# Patient Record
Sex: MALE | Race: WHITE | NOT HISPANIC OR LATINO | URBAN - METROPOLITAN AREA
[De-identification: names, ages, dates, MRNs, and addresses within clinical notes are randomized per-mention and may not be internally consistent; named-entity substitution may affect disease eponyms.]

---

## 2017-11-16 ENCOUNTER — EMERGENCY (EMERGENCY)
Facility: HOSPITAL | Age: 32
LOS: 1 days | Discharge: HOME | End: 2017-11-16
Attending: EMERGENCY MEDICINE | Admitting: EMERGENCY MEDICINE
Payer: COMMERCIAL

## 2017-11-16 ENCOUNTER — TRANSCRIPTION ENCOUNTER (OUTPATIENT)
Age: 32
End: 2017-11-16

## 2017-11-16 VITALS
TEMPERATURE: 98 F | HEART RATE: 75 BPM | SYSTOLIC BLOOD PRESSURE: 120 MMHG | OXYGEN SATURATION: 99 % | RESPIRATION RATE: 15 BRPM | DIASTOLIC BLOOD PRESSURE: 75 MMHG

## 2017-11-16 VITALS
RESPIRATION RATE: 16 BRPM | WEIGHT: 184.97 LBS | SYSTOLIC BLOOD PRESSURE: 140 MMHG | TEMPERATURE: 98 F | DIASTOLIC BLOOD PRESSURE: 80 MMHG | OXYGEN SATURATION: 98 % | HEART RATE: 80 BPM | HEIGHT: 71 IN

## 2017-11-16 PROCEDURE — 99284 EMERGENCY DEPT VISIT MOD MDM: CPT | Mod: 25

## 2017-11-16 PROCEDURE — 96372 THER/PROPH/DIAG INJ SC/IM: CPT

## 2017-11-16 PROCEDURE — 99284 EMERGENCY DEPT VISIT MOD MDM: CPT

## 2017-11-16 PROCEDURE — 72125 CT NECK SPINE W/O DYE: CPT | Mod: 26

## 2017-11-16 PROCEDURE — 72125 CT NECK SPINE W/O DYE: CPT

## 2017-11-16 RX ORDER — DIAZEPAM 5 MG
5 TABLET ORAL ONCE
Qty: 0 | Refills: 0 | Status: DISCONTINUED | OUTPATIENT
Start: 2017-11-16 | End: 2017-11-16

## 2017-11-16 RX ORDER — KETOROLAC TROMETHAMINE 30 MG/ML
60 SYRINGE (ML) INJECTION ONCE
Qty: 0 | Refills: 0 | Status: DISCONTINUED | OUTPATIENT
Start: 2017-11-16 | End: 2017-11-16

## 2017-11-16 RX ADMIN — Medication 60 MILLIGRAM(S): at 17:00

## 2017-11-16 RX ADMIN — Medication 60 MILLIGRAM(S): at 17:23

## 2017-11-16 RX ADMIN — Medication 60 MILLIGRAM(S): at 16:29

## 2017-11-16 RX ADMIN — Medication 5 MILLIGRAM(S): at 16:29

## 2017-11-16 NOTE — ED PROVIDER NOTE - MEDICAL DECISION MAKING DETAILS
31 YO M WITH L NECK PAIN X 2 DAYS. PT ALREADY TAKING ADVIL AND STARTED ON FLEXERIL TODAY AT URGENT CARE, C/O WORSENING PAIN, NVI. WILL GET CT C-SPINE, VALIUM AND TORADOL, RE-ASSESS. FEELING BETTER, WILL D/C HOME ON ADDITIONAL PREDNISONE AND F/U PMD

## 2017-11-16 NOTE — ED PROVIDER NOTE - PROGRESS NOTE DETAILS
Pt examined by ED attending, Dr. Carlos who agreed with disposition and plan.  CT scan results given to pt, feeling better, pt already has advil and flexeril. Will add prednisone and f/u pmd.

## 2017-11-16 NOTE — ED PROVIDER NOTE - ATTENDING CONTRIBUTION TO CARE
I, Dr Carlos, performed the initial face to face bedside interview with this patient regarding history of present illness, review of symptoms and relevant past medical, social and family history.  I completed an independent physical examination and confirmed and agree PA/NP Physical exam and finding.  I was the initial provider who evaluated this patient. I have signed out the follow up of any pending tests (i.e. labs, radiological studies) to the ACP.  I have communicated the patient’s plan of care and disposition with the ACP.

## 2017-11-16 NOTE — ED PROVIDER NOTE - OBJECTIVE STATEMENT
33 y/o M presents with c/o neck pain x 2 days. Pt states that he woke up yesterday morning with the pain, applied ice, has been taking advil 800mg and got an otc soft collar. Pt states that pain was worse today, went to [facility] urgent care and was given flexeril 10mg three times daily. Pt states that he is still in pain and decided to come to ED. States that pain is constant, worse with movement. Denies trauma/fall, numbness, tingling, focal weakness, headache, dizziness, cp, sob, n/v or other symptoms.

## 2017-11-16 NOTE — ED PROVIDER NOTE - MUSCULOSKELETAL NECK EXAM
PAIN ON MOVEMENT/+ttp L paravertebral spine with FROM but with pain on movement, no midline tenderness, no swelling/erythema/ecchymosis noted, +5/5 strength B UE&LE, pulses and sensation intact, fingers warm&mobile, NVI, no nuchal rigidity

## 2017-11-16 NOTE — ED PROVIDER NOTE - CHPI ED SYMPTOMS NEG
no back pain/no abrasion/no fever/no bruising/no tingling/no deformity/no weakness/no difficulty bearing weight/no numbness/no stiffness

## 2018-01-30 ENCOUNTER — EMERGENCY (EMERGENCY)
Facility: HOSPITAL | Age: 33
LOS: 1 days | Discharge: HOME | End: 2018-01-30
Attending: EMERGENCY MEDICINE | Admitting: EMERGENCY MEDICINE
Payer: COMMERCIAL

## 2018-01-30 VITALS
RESPIRATION RATE: 20 BRPM | HEIGHT: 71 IN | HEART RATE: 188 BPM | WEIGHT: 184.97 LBS | OXYGEN SATURATION: 100 % | SYSTOLIC BLOOD PRESSURE: 115 MMHG | DIASTOLIC BLOOD PRESSURE: 58 MMHG | TEMPERATURE: 98 F

## 2018-01-30 PROCEDURE — 93010 ELECTROCARDIOGRAM REPORT: CPT

## 2018-01-30 PROCEDURE — 99285 EMERGENCY DEPT VISIT HI MDM: CPT

## 2018-01-31 ENCOUNTER — EMERGENCY (EMERGENCY)
Facility: HOSPITAL | Age: 33
LOS: 1 days | Discharge: HOME | End: 2018-01-31
Attending: EMERGENCY MEDICINE | Admitting: EMERGENCY MEDICINE
Payer: COMMERCIAL

## 2018-01-31 VITALS
TEMPERATURE: 97 F | HEART RATE: 166 BPM | WEIGHT: 184.97 LBS | SYSTOLIC BLOOD PRESSURE: 108 MMHG | DIASTOLIC BLOOD PRESSURE: 63 MMHG | OXYGEN SATURATION: 98 % | HEIGHT: 71 IN | RESPIRATION RATE: 17 BRPM

## 2018-01-31 VITALS
DIASTOLIC BLOOD PRESSURE: 61 MMHG | RESPIRATION RATE: 14 BRPM | HEART RATE: 127 BPM | SYSTOLIC BLOOD PRESSURE: 105 MMHG | OXYGEN SATURATION: 98 %

## 2018-01-31 VITALS
RESPIRATION RATE: 16 BRPM | TEMPERATURE: 98 F | HEART RATE: 87 BPM | SYSTOLIC BLOOD PRESSURE: 97 MMHG | OXYGEN SATURATION: 98 % | DIASTOLIC BLOOD PRESSURE: 61 MMHG

## 2018-01-31 DIAGNOSIS — Z98.890 OTHER SPECIFIED POSTPROCEDURAL STATES: Chronic | ICD-10-CM

## 2018-01-31 LAB
ALBUMIN SERPL ELPH-MCNC: 3.8 G/DL — SIGNIFICANT CHANGE UP (ref 3.3–5)
ALBUMIN SERPL ELPH-MCNC: 4.4 G/DL — SIGNIFICANT CHANGE UP (ref 3.3–5)
ALP SERPL-CCNC: 115 U/L — SIGNIFICANT CHANGE UP (ref 30–120)
ALP SERPL-CCNC: 145 U/L — HIGH (ref 30–120)
ALT FLD-CCNC: 21 U/L DA — SIGNIFICANT CHANGE UP (ref 10–60)
ALT FLD-CCNC: 24 U/L DA — SIGNIFICANT CHANGE UP (ref 10–60)
ANION GAP SERPL CALC-SCNC: 10 MMOL/L — SIGNIFICANT CHANGE UP (ref 5–17)
ANION GAP SERPL CALC-SCNC: 9 MMOL/L — SIGNIFICANT CHANGE UP (ref 5–17)
APTT BLD: 34 SEC — SIGNIFICANT CHANGE UP (ref 27.5–37.4)
AST SERPL-CCNC: 19 U/L — SIGNIFICANT CHANGE UP (ref 10–40)
AST SERPL-CCNC: 21 U/L — SIGNIFICANT CHANGE UP (ref 10–40)
BASOPHILS # BLD AUTO: 0 K/UL — SIGNIFICANT CHANGE UP (ref 0–0.2)
BASOPHILS # BLD AUTO: 0.1 K/UL — SIGNIFICANT CHANGE UP (ref 0–0.2)
BASOPHILS NFR BLD AUTO: 0.4 % — SIGNIFICANT CHANGE UP (ref 0–2)
BASOPHILS NFR BLD AUTO: 0.7 % — SIGNIFICANT CHANGE UP (ref 0–2)
BILIRUB SERPL-MCNC: 0.4 MG/DL — SIGNIFICANT CHANGE UP (ref 0.2–1.2)
BILIRUB SERPL-MCNC: 0.5 MG/DL — SIGNIFICANT CHANGE UP (ref 0.2–1.2)
BUN SERPL-MCNC: 17 MG/DL — SIGNIFICANT CHANGE UP (ref 7–23)
BUN SERPL-MCNC: 23 MG/DL — SIGNIFICANT CHANGE UP (ref 7–23)
CALCIUM SERPL-MCNC: 8.5 MG/DL — SIGNIFICANT CHANGE UP (ref 8.4–10.5)
CALCIUM SERPL-MCNC: 9.1 MG/DL — SIGNIFICANT CHANGE UP (ref 8.4–10.5)
CHLORIDE SERPL-SCNC: 104 MMOL/L — SIGNIFICANT CHANGE UP (ref 96–108)
CHLORIDE SERPL-SCNC: 107 MMOL/L — SIGNIFICANT CHANGE UP (ref 96–108)
CK MB CFR SERPL CALC: 2.6 NG/ML — SIGNIFICANT CHANGE UP (ref 0–3.6)
CO2 SERPL-SCNC: 26 MMOL/L — SIGNIFICANT CHANGE UP (ref 22–31)
CO2 SERPL-SCNC: 30 MMOL/L — SIGNIFICANT CHANGE UP (ref 22–31)
CREAT SERPL-MCNC: 1.03 MG/DL — SIGNIFICANT CHANGE UP (ref 0.5–1.3)
CREAT SERPL-MCNC: 1.25 MG/DL — SIGNIFICANT CHANGE UP (ref 0.5–1.3)
EOSINOPHIL # BLD AUTO: 0.1 K/UL — SIGNIFICANT CHANGE UP (ref 0–0.5)
EOSINOPHIL # BLD AUTO: 0.1 K/UL — SIGNIFICANT CHANGE UP (ref 0–0.5)
EOSINOPHIL NFR BLD AUTO: 0.5 % — SIGNIFICANT CHANGE UP (ref 0–6)
EOSINOPHIL NFR BLD AUTO: 0.8 % — SIGNIFICANT CHANGE UP (ref 0–6)
GLUCOSE SERPL-MCNC: 100 MG/DL — HIGH (ref 70–99)
GLUCOSE SERPL-MCNC: 99 MG/DL — SIGNIFICANT CHANGE UP (ref 70–99)
HCT VFR BLD CALC: 46 % — SIGNIFICANT CHANGE UP (ref 39–50)
HCT VFR BLD CALC: 47.8 % — SIGNIFICANT CHANGE UP (ref 39–50)
HGB BLD-MCNC: 15.4 G/DL — SIGNIFICANT CHANGE UP (ref 13–17)
HGB BLD-MCNC: 16.7 G/DL — SIGNIFICANT CHANGE UP (ref 13–17)
INR BLD: 1.11 RATIO — SIGNIFICANT CHANGE UP (ref 0.88–1.16)
LYMPHOCYTES # BLD AUTO: 1.3 K/UL — SIGNIFICANT CHANGE UP (ref 1–3.3)
LYMPHOCYTES # BLD AUTO: 1.7 K/UL — SIGNIFICANT CHANGE UP (ref 1–3.3)
LYMPHOCYTES # BLD AUTO: 13 % — SIGNIFICANT CHANGE UP (ref 13–44)
LYMPHOCYTES # BLD AUTO: 22 % — SIGNIFICANT CHANGE UP (ref 13–44)
MAGNESIUM SERPL-MCNC: 2.1 MG/DL — SIGNIFICANT CHANGE UP (ref 1.6–2.6)
MCHC RBC-ENTMCNC: 29.8 PG — SIGNIFICANT CHANGE UP (ref 27–34)
MCHC RBC-ENTMCNC: 30.9 PG — SIGNIFICANT CHANGE UP (ref 27–34)
MCHC RBC-ENTMCNC: 33.5 GM/DL — SIGNIFICANT CHANGE UP (ref 32–36)
MCHC RBC-ENTMCNC: 35 GM/DL — SIGNIFICANT CHANGE UP (ref 32–36)
MCV RBC AUTO: 88.2 FL — SIGNIFICANT CHANGE UP (ref 80–100)
MCV RBC AUTO: 88.9 FL — SIGNIFICANT CHANGE UP (ref 80–100)
MONOCYTES # BLD AUTO: 0.6 K/UL — SIGNIFICANT CHANGE UP (ref 0–0.9)
MONOCYTES # BLD AUTO: 0.7 K/UL — SIGNIFICANT CHANGE UP (ref 0–0.9)
MONOCYTES NFR BLD AUTO: 5.9 % — SIGNIFICANT CHANGE UP (ref 2–14)
MONOCYTES NFR BLD AUTO: 9.5 % — SIGNIFICANT CHANGE UP (ref 2–14)
NEUTROPHILS # BLD AUTO: 5.1 K/UL — SIGNIFICANT CHANGE UP (ref 1.8–7.4)
NEUTROPHILS # BLD AUTO: 8.1 K/UL — HIGH (ref 1.8–7.4)
NEUTROPHILS NFR BLD AUTO: 67 % — SIGNIFICANT CHANGE UP (ref 43–77)
NEUTROPHILS NFR BLD AUTO: 80.2 % — HIGH (ref 43–77)
PLATELET # BLD AUTO: 223 K/UL — SIGNIFICANT CHANGE UP (ref 150–400)
PLATELET # BLD AUTO: 233 K/UL — SIGNIFICANT CHANGE UP (ref 150–400)
POTASSIUM SERPL-MCNC: 3.8 MMOL/L — SIGNIFICANT CHANGE UP (ref 3.5–5.3)
POTASSIUM SERPL-MCNC: 3.8 MMOL/L — SIGNIFICANT CHANGE UP (ref 3.5–5.3)
POTASSIUM SERPL-SCNC: 3.8 MMOL/L — SIGNIFICANT CHANGE UP (ref 3.5–5.3)
POTASSIUM SERPL-SCNC: 3.8 MMOL/L — SIGNIFICANT CHANGE UP (ref 3.5–5.3)
PROT SERPL-MCNC: 6.7 G/DL — SIGNIFICANT CHANGE UP (ref 6–8.3)
PROT SERPL-MCNC: 7.6 G/DL — SIGNIFICANT CHANGE UP (ref 6–8.3)
PROTHROM AB SERPL-ACNC: 12.1 SEC — SIGNIFICANT CHANGE UP (ref 9.8–12.7)
RBC # BLD: 5.17 M/UL — SIGNIFICANT CHANGE UP (ref 4.2–5.8)
RBC # BLD: 5.42 M/UL — SIGNIFICANT CHANGE UP (ref 4.2–5.8)
RBC # FLD: 11.5 % — SIGNIFICANT CHANGE UP (ref 10.3–14.5)
RBC # FLD: 11.8 % — SIGNIFICANT CHANGE UP (ref 10.3–14.5)
SODIUM SERPL-SCNC: 142 MMOL/L — SIGNIFICANT CHANGE UP (ref 135–145)
SODIUM SERPL-SCNC: 144 MMOL/L — SIGNIFICANT CHANGE UP (ref 135–145)
TROPONIN I SERPL-MCNC: 0.24 NG/ML — HIGH (ref 0.02–0.06)
TROPONIN I SERPL-MCNC: 0.31 NG/ML — HIGH (ref 0.02–0.06)
WBC # BLD: 10.1 K/UL — SIGNIFICANT CHANGE UP (ref 3.8–10.5)
WBC # BLD: 7.6 K/UL — SIGNIFICANT CHANGE UP (ref 3.8–10.5)
WBC # FLD AUTO: 10.1 K/UL — SIGNIFICANT CHANGE UP (ref 3.8–10.5)
WBC # FLD AUTO: 7.6 K/UL — SIGNIFICANT CHANGE UP (ref 3.8–10.5)

## 2018-01-31 PROCEDURE — 80053 COMPREHEN METABOLIC PANEL: CPT

## 2018-01-31 PROCEDURE — 83735 ASSAY OF MAGNESIUM: CPT

## 2018-01-31 PROCEDURE — 82553 CREATINE MB FRACTION: CPT

## 2018-01-31 PROCEDURE — 99284 EMERGENCY DEPT VISIT MOD MDM: CPT | Mod: 25

## 2018-01-31 PROCEDURE — 84484 ASSAY OF TROPONIN QUANT: CPT

## 2018-01-31 PROCEDURE — 96375 TX/PRO/DX INJ NEW DRUG ADDON: CPT

## 2018-01-31 PROCEDURE — 96376 TX/PRO/DX INJ SAME DRUG ADON: CPT

## 2018-01-31 PROCEDURE — 85610 PROTHROMBIN TIME: CPT

## 2018-01-31 PROCEDURE — 36415 COLL VENOUS BLD VENIPUNCTURE: CPT

## 2018-01-31 PROCEDURE — 71045 X-RAY EXAM CHEST 1 VIEW: CPT

## 2018-01-31 PROCEDURE — 85027 COMPLETE CBC AUTOMATED: CPT

## 2018-01-31 PROCEDURE — 96374 THER/PROPH/DIAG INJ IV PUSH: CPT

## 2018-01-31 PROCEDURE — 93010 ELECTROCARDIOGRAM REPORT: CPT

## 2018-01-31 PROCEDURE — 85730 THROMBOPLASTIN TIME PARTIAL: CPT

## 2018-01-31 PROCEDURE — 71045 X-RAY EXAM CHEST 1 VIEW: CPT | Mod: 26

## 2018-01-31 PROCEDURE — 93010 ELECTROCARDIOGRAM REPORT: CPT | Mod: 76

## 2018-01-31 PROCEDURE — 93005 ELECTROCARDIOGRAM TRACING: CPT

## 2018-01-31 PROCEDURE — 99285 EMERGENCY DEPT VISIT HI MDM: CPT

## 2018-01-31 RX ORDER — ADENOSINE 3 MG/ML
12 INJECTION INTRAVENOUS ONCE
Qty: 0 | Refills: 0 | Status: COMPLETED | OUTPATIENT
Start: 2018-01-31 | End: 2018-01-31

## 2018-01-31 RX ORDER — VERAPAMIL HCL 240 MG
5 CAPSULE, EXTENDED RELEASE PELLETS 24 HR ORAL ONCE
Qty: 0 | Refills: 0 | Status: COMPLETED | OUTPATIENT
Start: 2018-01-31 | End: 2018-01-31

## 2018-01-31 RX ORDER — ADENOSINE 3 MG/ML
6 INJECTION INTRAVENOUS ONCE
Qty: 0 | Refills: 0 | Status: COMPLETED | OUTPATIENT
Start: 2018-01-31 | End: 2018-01-31

## 2018-01-31 RX ORDER — METOPROLOL TARTRATE 50 MG
25 TABLET ORAL ONCE
Qty: 0 | Refills: 0 | Status: COMPLETED | OUTPATIENT
Start: 2018-01-31 | End: 2018-01-31

## 2018-01-31 RX ORDER — METOPROLOL TARTRATE 50 MG
1 TABLET ORAL
Qty: 30 | Refills: 0
Start: 2018-01-31 | End: 2018-03-01

## 2018-01-31 RX ADMIN — ADENOSINE 6 MILLIGRAM(S): 3 INJECTION INTRAVENOUS at 00:06

## 2018-01-31 RX ADMIN — Medication 5 MILLIGRAM(S): at 00:25

## 2018-01-31 RX ADMIN — Medication 25 MILLIGRAM(S): at 10:29

## 2018-01-31 RX ADMIN — ADENOSINE 6 MILLIGRAM(S): 3 INJECTION INTRAVENOUS at 00:15

## 2018-01-31 RX ADMIN — ADENOSINE 6 MILLIGRAM(S): 3 INJECTION INTRAVENOUS at 08:35

## 2018-01-31 RX ADMIN — ADENOSINE 6 MILLIGRAM(S): 3 INJECTION INTRAVENOUS at 00:01

## 2018-01-31 RX ADMIN — Medication 5 MILLIGRAM(S): at 08:40

## 2018-01-31 NOTE — ED PROVIDER NOTE - OBJECTIVE STATEMENT
Patient c/o his heart racing for about an hour. Has a history of a. fib in the past. Has had 2 ablations, last one 3 years ago, and has had no symptoms since.    Now feels minor discomfort in chest, no lightheadedness, no SOB. Ne fever or recent illness

## 2018-01-31 NOTE — ED ADULT NURSE NOTE - OBJECTIVE STATEMENT
32 yr old male c/o chest palpitations and slight chest discomfort about 1 hr ago. Pt has hx of afib. Pt states playing soccer; felt fine; went home to eat and started feeling chest palpitations.

## 2018-01-31 NOTE — ED PROVIDER NOTE - PROGRESS NOTE DETAILS
Patient did not break after 3 doses of adenosine. Given IV verapamil with termination of SVT. Now in NSR with short CO and no delta wave (?[id number]-[id number]-[id number]?)

## 2018-01-31 NOTE — ED PROVIDER NOTE - OBJECTIVE STATEMENT
33 yo male with hx of svt sp ablation at [facility] 2 yrs ago Dr. Maddox seen in ER last night for PSVT converted after adenosine and then Verapamil. CO recurrent SVT again this am. Palps and SOB

## 2018-01-31 NOTE — CONSULT NOTE ADULT - ASSESSMENT
The patient is a 32 year old male with a history of AF s/p ablation (PVI 3 years ago) who presents with palpitations and shortness of breath in the setting of SVT.    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 However, baseline ECG after return to sinus rhythm with inverted/biphasic P waves with short NV. This may be due to changes from prior ablations vs. less likely pre-excitation.  - Await call from patient's EP Dr. Maddox  - Will likely prescribe long acting oral medication (i.e. metoprolol succinate 25 mg daily) to prevent recurrence of SVT  - Patient will have close follow-up with EP regarding new SVT and possible need for ablation

## 2018-01-31 NOTE — ED PROVIDER NOTE - MEDICAL DECISION MAKING DETAILS
31 yo recurrent SVT treated with IV Verapamil twice. Now NSR. Plan Cardio consult. May need long term meds and repeat EPS studies.

## 2018-01-31 NOTE — ED PROVIDER NOTE - NS ED MD EKG INTERPRETATION 1
normal sinus rhythm, Normal axis, Normal NV interval and QRS complex. There are no acute ischemic ST or T-wave changes./after meds

## 2018-01-31 NOTE — ED PROVIDER NOTE - CHPI ED SYMPTOMS NEG
no syncope/no shortness of breath/no vomiting/no diaphoresis/no nausea/no cough/no back pain/no dizziness/no chills/no fever

## 2018-01-31 NOTE — CONSULT NOTE ADULT - SUBJECTIVE AND OBJECTIVE BOX
History of Present Illness: The patient is a 32 year old male with a history of AF s/p ablation (PVI 3 years ago) who presents with palpitations and shortness of breath. He was seen in ED overnight for similar symptoms lasting for hours, starting after playing soccer yesterday. He was found to be in SVT, given adenosine x3 with no effect, then verapamil with subsequent return to sinus rhythm. He returned this morning with recurrence of SVT. Again one dose of adenosine with no effect, then verapamil with subsequent termination.    Past Medical/Surgical History:  AF s/p ablation    Medications:  None    Family History: Non-contributory family history of premature cardiovascular atherosclerotic disease    Social History: No tobacco, alcohol or drug use    Review of Systems:  General: No fevers, chills, weight loss or gain  Skin: No rashes, color changes  Cardiovascular: No chest pain, orthopnea  Respiratory: (+) shortness of breath, no cough  Gastrointestinal: No nausea, abdominal pain  Genitourinary: No incontinence, pain with urination  Musculoskeletal: No pain, swelling, decreased range of motion  Neurological: No headache, weakness  Psychiatric: No depression, anxiety  Endocrine: No weight loss or gain, increased thirst  All other systems are comprehensively negative.    Physical Exam:  Vitals:        Vital Signs Last 24 Hrs  T(C): 36.2 (31 Jan 2018 08:07), Max: 36.8 (30 Jan 2018 23:45)  T(F): 97.2 (31 Jan 2018 08:07), Max: 98.2 (30 Jan 2018 23:45)  HR: 166 (31 Jan 2018 08:07) (86 - 188)  BP: 108/63 (31 Jan 2018 08:07) (97/61 - 115/58)  BP(mean): --  RR: 17 (31 Jan 2018 08:07) (16 - 20)  SpO2: 98% (31 Jan 2018 08:07) (98% - 100%)  General: NAD  HEENT: MMM  Neck: No JVD, no carotid bruit  Lungs: CTAB  CV: RRR, nl S1/S2, no M/R/G  Abdomen: S/NT/ND, +BS  Extremities: No LE edema, no cyanosis  Neuro: AAOx3, non-focal  Skin: No rash    Labs:                        15.4   10.1  )-----------( 223      ( 31 Jan 2018 08:58 )             46.0     01-31    144  |  104  |  23  ----------------------------<  100<H>  3.8   |  30  |  1.25    Ca    9.1      31 Jan 2018 00:38  Mg     2.1     01-31    TPro  7.6  /  Alb  4.4  /  TBili  0.4  /  DBili  x   /  AST  21  /  ALT  24  /  AlkPhos  145<H>  01-31            ECG:   #1: SVT, likely AVNRT  #2: Sinus rhythm, inverted/biphasic P waves in inferior leads, nonspecific abnormalities

## 2018-01-31 NOTE — ED ADULT NURSE NOTE - CHPI ED SYMPTOMS NEG
no syncope/no shortness of breath/no diaphoresis/no nausea/no dizziness/no back pain/no cough/no vomiting/no chills/no fever

## 2018-01-31 NOTE — ED PROVIDER NOTE - PROGRESS NOTE DETAILS
SVT resolved after verapamil 5mg. Seen by Dr. Barros. Elevated trops likely due to increased rate and myocardial demand. No concern for CAD due to lack of risk factors. Cleared for discharge with NILESH Maddox at [facility] today. Pts wife made appt via telephone with cardiologist. Will take Metoprolol XL 25mg today and rx for the same.

## 2018-05-15 ENCOUNTER — TRANSCRIPTION ENCOUNTER (OUTPATIENT)
Age: 33
End: 2018-05-15

## 2018-06-24 NOTE — ED PROVIDER NOTE - CARDIAC HEART SOUNDS
S1-S2 I will SWITCH the dose or number of times a day I take the medications listed below when I get home from the hospital:  None

## 2018-10-17 NOTE — ED PROVIDER NOTE - CROS ED CARDIOVAS ALL NEG
Pt called for a refill on his Neurontin sent to the [facility] next appt 1/15/2019    Health Maintenance   Topic Date Due    HIV screen  09/15/1987    Pneumococcal med risk (1 of 1 - PPSV23) 09/15/1991    Flu vaccine (1) 09/01/2018    Potassium monitoring  01/18/2019    Creatinine monitoring  01/18/2019    Lipid screen  02/24/2022    DTaP/Tdap/Td vaccine (2 - Td) 02/20/2028             (applicable per patient's age: Cancer Screenings, Depression Screening, Fall Risk Screening, Immunizations)    Hemoglobin A1C (%)   Date Value   01/18/2018 5.1   01/19/2016 5.2     LDL Cholesterol (mg/dL)   Date Value   02/24/2017 102     AST (U/L)   Date Value   01/18/2018 54 (H)     ALT (U/L)   Date Value   01/18/2018 47 (H)     BUN (mg/dL)   Date Value   01/18/2018 11      (goal A1C is < 7)   (goal LDL is <100) need 30-50% reduction from baseline     BP Readings from Last 3 Encounters:   10/10/18 (!) 126/90   10/10/18 (!) 140/98   08/07/18 139/88    (goal /80)      All Future Testing planned in [facility]:  Lab Frequency Next Occurrence   Lipid, Fasting Once 10/10/2018   HIV Screen Once 10/10/2018       Next Visit Date:  Future Appointments  Date Time Provider Fide Dang   11/28/2018 1:15 PM Lawson Syed DPM [facility]   1/15/2019 10:00 AM Magen Ibanez MD [facility]            Patient Active Problem List:     Uncontrolled hypertension     OA (osteoarthritis) of hip     Tendinitis of right shoulder     Callus of foot     Gouty arthritis     Uncontrolled hypertension     Mild intermittent asthma without complication
- - -

## 2019-03-17 ENCOUNTER — TRANSCRIPTION ENCOUNTER (OUTPATIENT)
Age: 34
End: 2019-03-17

## 2019-08-15 NOTE — ED ADULT TRIAGE NOTE - SPO2 (%)
Three attempts has been called to reach patient to go over medication list. Sent patient Add[employer] message.  Rohit Yates CMA on 8/15/2019 at 7:22 AM     100

## 2020-08-30 ENCOUNTER — TRANSCRIPTION ENCOUNTER (OUTPATIENT)
Age: 35
End: 2020-08-30

## 2021-03-29 ENCOUNTER — TRANSCRIPTION ENCOUNTER (OUTPATIENT)
Age: 36
End: 2021-03-29

## 2021-04-26 ENCOUNTER — TRANSCRIPTION ENCOUNTER (OUTPATIENT)
Age: 36
End: 2021-04-26

## 2021-05-30 ENCOUNTER — TRANSCRIPTION ENCOUNTER (OUTPATIENT)
Age: 36
End: 2021-05-30

## 2021-09-18 ENCOUNTER — TRANSCRIPTION ENCOUNTER (OUTPATIENT)
Age: 36
End: 2021-09-18

## 2021-11-16 ENCOUNTER — TRANSCRIPTION ENCOUNTER (OUTPATIENT)
Age: 36
End: 2021-11-16

## 2022-11-11 ENCOUNTER — NON-APPOINTMENT (OUTPATIENT)
Age: 37
End: 2022-11-11

## 2022-12-27 ENCOUNTER — NON-APPOINTMENT (OUTPATIENT)
Age: 37
End: 2022-12-27

## 2023-01-06 NOTE — ED PROVIDER NOTE - HEAD, MLM
Head is atraumatic. Head shape is symmetrical. Dupixent Pregnancy And Lactation Text: This medication likely crosses the placenta but the risk for the fetus is uncertain. This medication is excreted in breast milk.

## 2023-01-23 PROBLEM — I48.91 UNSPECIFIED ATRIAL FIBRILLATION: Chronic | Status: ACTIVE | Noted: 2018-01-31

## 2023-02-03 PROBLEM — Z00.00 ENCOUNTER FOR PREVENTIVE HEALTH EXAMINATION: Status: ACTIVE | Noted: 2023-02-03

## 2023-02-08 PROBLEM — K64.8 INTERNAL HEMORRHOIDS: Status: RESOLVED | Noted: 2023-02-08 | Resolved: 2023-02-08

## 2023-02-10 ENCOUNTER — APPOINTMENT (OUTPATIENT)
Dept: SURGERY | Facility: CLINIC | Age: 38
End: 2023-02-10
Payer: COMMERCIAL

## 2023-02-10 VITALS
DIASTOLIC BLOOD PRESSURE: 79 MMHG | TEMPERATURE: 97.2 F | OXYGEN SATURATION: 96 % | RESPIRATION RATE: 17 BRPM | WEIGHT: 195 LBS | HEART RATE: 87 BPM | HEIGHT: 70 IN | SYSTOLIC BLOOD PRESSURE: 117 MMHG | BODY MASS INDEX: 27.92 KG/M2

## 2023-02-10 DIAGNOSIS — Z80.0 FAMILY HISTORY OF MALIGNANT NEOPLASM OF DIGESTIVE ORGANS: ICD-10-CM

## 2023-02-10 DIAGNOSIS — Z78.9 OTHER SPECIFIED HEALTH STATUS: ICD-10-CM

## 2023-02-10 DIAGNOSIS — K64.8 OTHER HEMORRHOIDS: ICD-10-CM

## 2023-02-10 DIAGNOSIS — Z83.6 FAMILY HISTORY OF OTHER DISEASES OF THE RESPIRATORY SYSTEM: ICD-10-CM

## 2023-02-10 PROCEDURE — 99203 OFFICE O/P NEW LOW 30 MIN: CPT

## 2023-02-10 RX ORDER — DEXTROAMPHETAMINE SACCHARATE, AMPHETAMINE ASPARTATE, DEXTROAMPHETAMINE SULFATE, AND AMPHETAMINE SULFATE 2.5; 2.5; 2.5; 2.5 MG/1; MG/1; MG/1; MG/1
10 TABLET ORAL
Refills: 0 | Status: ACTIVE | COMMUNITY

## 2023-02-10 NOTE — ASSESSMENT
[FreeTextEntry1] : In summary the patient has a symptomatic posterior midline anal tag.  There is possible associated anal fistula and may be an underlying fissure.  I therefore recommended excision of his tag under sedation in the operating room.  If a fistula is seen I will likely perform an anal fistulotomy.  If there is an underlying fissure I will likely perform Botox injection.  Risks benefits and alternatives including the rare complications of bleeding infection and recurrence were explained.

## 2023-02-10 NOTE — CONSULT LETTER
[Dear  ___] : Dear  [unfilled], [Consult Letter:] : I had the pleasure of evaluating your patient, [unfilled]. [Please see my note below.] : Please see my note below. [Consult Closing:] : Thank you very much for allowing me to participate in the care of this patient.  If you have any questions, please do not hesitate to contact me. [Sincerely,] : Sincerely, [FreeTextEntry3] : Levon Borges M.D., F.A.C.S, F.A.S.C.R.S [DrPaolo  ___] : Dr. ALMENDAREZ

## 2023-02-10 NOTE — PHYSICAL EXAM
[Normal Breath Sounds] : Normal breath sounds [Normal Heart Sounds] : normal heart sounds [No Rash or Lesion] : No rash or lesion [Alert] : alert [Oriented to Person] : oriented to person [Oriented to Place] : oriented to place [Oriented to Time] : oriented to time [Calm] : calm [Abdomen Tenderness] : ~T No ~M abdominal tenderness [de-identified] : Perianal inspection reveals a posterior midline anal tag.  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  There is mild tenderness in the posterior midline with no visible fissure. [de-identified] : WNL [de-identified] : WNL [de-identified] : JUNEL [de-identified] : WNL ROM [de-identified] : WNL

## 2023-02-10 NOTE — HISTORY OF PRESENT ILLNESS
[FreeTextEntry1] : Bello is a 37 year old male is her for initial consultation for anal skin tag.\par \par Colonoscopy 9/13/2018 - Internal hemorrhoids. Normal mucosa to the cecum and distal ileum.\par \par Today pt reports no pain. Daily BMs, formed, no straining, denies pain, occasional bleeding on tp on - and - off for about 15 years, hard to stay clean sometimes, and does feel swollen tissue. Not taking any anticoagulants. Colonoscopy? Family hx of colon cancer, father dx in late 50s.

## 2023-02-15 ENCOUNTER — OUTPATIENT (OUTPATIENT)
Dept: OUTPATIENT SERVICES | Facility: HOSPITAL | Age: 38
LOS: 1 days | End: 2023-02-15
Payer: COMMERCIAL

## 2023-02-15 VITALS
RESPIRATION RATE: 20 BRPM | OXYGEN SATURATION: 97 % | HEIGHT: 70 IN | TEMPERATURE: 98 F | SYSTOLIC BLOOD PRESSURE: 127 MMHG | HEART RATE: 70 BPM | WEIGHT: 199.08 LBS | DIASTOLIC BLOOD PRESSURE: 89 MMHG

## 2023-02-15 DIAGNOSIS — Z98.890 OTHER SPECIFIED POSTPROCEDURAL STATES: Chronic | ICD-10-CM

## 2023-02-15 DIAGNOSIS — K64.4 RESIDUAL HEMORRHOIDAL SKIN TAGS: ICD-10-CM

## 2023-02-15 DIAGNOSIS — K60.3 ANAL FISTULA: ICD-10-CM

## 2023-02-15 DIAGNOSIS — I48.91 UNSPECIFIED ATRIAL FIBRILLATION: ICD-10-CM

## 2023-02-15 DIAGNOSIS — Z86.2 PERSONAL HISTORY OF DISEASES OF THE BLOOD AND BLOOD-FORMING ORGANS AND CERTAIN DISORDERS INVOLVING THE IMMUNE MECHANISM: ICD-10-CM

## 2023-02-15 DIAGNOSIS — Z01.818 ENCOUNTER FOR OTHER PREPROCEDURAL EXAMINATION: ICD-10-CM

## 2023-02-15 LAB
ANION GAP SERPL CALC-SCNC: 11 MMOL/L — SIGNIFICANT CHANGE UP (ref 5–17)
BUN SERPL-MCNC: 15 MG/DL — SIGNIFICANT CHANGE UP (ref 7–23)
CALCIUM SERPL-MCNC: 9.5 MG/DL — SIGNIFICANT CHANGE UP (ref 8.4–10.5)
CHLORIDE SERPL-SCNC: 102 MMOL/L — SIGNIFICANT CHANGE UP (ref 96–108)
CO2 SERPL-SCNC: 25 MMOL/L — SIGNIFICANT CHANGE UP (ref 22–31)
CREAT SERPL-MCNC: 0.83 MG/DL — SIGNIFICANT CHANGE UP (ref 0.5–1.3)
EGFR: 117 ML/MIN/1.73M2 — SIGNIFICANT CHANGE UP
GLUCOSE SERPL-MCNC: 91 MG/DL — SIGNIFICANT CHANGE UP (ref 70–99)
HCT VFR BLD CALC: 44.8 % — SIGNIFICANT CHANGE UP (ref 39–50)
HGB BLD-MCNC: 15.2 G/DL — SIGNIFICANT CHANGE UP (ref 13–17)
MCHC RBC-ENTMCNC: 30 PG — SIGNIFICANT CHANGE UP (ref 27–34)
MCHC RBC-ENTMCNC: 33.9 GM/DL — SIGNIFICANT CHANGE UP (ref 32–36)
MCV RBC AUTO: 88.4 FL — SIGNIFICANT CHANGE UP (ref 80–100)
NRBC # BLD: 0 /100 WBCS — SIGNIFICANT CHANGE UP (ref 0–0)
PLATELET # BLD AUTO: 258 K/UL — SIGNIFICANT CHANGE UP (ref 150–400)
POTASSIUM SERPL-MCNC: 3.9 MMOL/L — SIGNIFICANT CHANGE UP (ref 3.5–5.3)
POTASSIUM SERPL-SCNC: 3.9 MMOL/L — SIGNIFICANT CHANGE UP (ref 3.5–5.3)
RBC # BLD: 5.07 M/UL — SIGNIFICANT CHANGE UP (ref 4.2–5.8)
RBC # FLD: 12.2 % — SIGNIFICANT CHANGE UP (ref 10.3–14.5)
SODIUM SERPL-SCNC: 138 MMOL/L — SIGNIFICANT CHANGE UP (ref 135–145)
WBC # BLD: 4.44 K/UL — SIGNIFICANT CHANGE UP (ref 3.8–10.5)
WBC # FLD AUTO: 4.44 K/UL — SIGNIFICANT CHANGE UP (ref 3.8–10.5)

## 2023-02-15 PROCEDURE — 93005 ELECTROCARDIOGRAM TRACING: CPT

## 2023-02-15 PROCEDURE — G0463: CPT

## 2023-02-15 PROCEDURE — 85027 COMPLETE CBC AUTOMATED: CPT

## 2023-02-15 PROCEDURE — 80048 BASIC METABOLIC PNL TOTAL CA: CPT

## 2023-02-15 PROCEDURE — 36415 COLL VENOUS BLD VENIPUNCTURE: CPT

## 2023-02-15 PROCEDURE — 93010 ELECTROCARDIOGRAM REPORT: CPT

## 2023-02-15 NOTE — H&P PST ADULT - PROBLEM SELECTOR PLAN 2
Pt had c/o epigastric pressure @ times- possible indigestion as per patient , had seen GI is scheduled to go for colonoscopy & endoscopy in March 2023. Pt endorsed that he had epigastric pressure in morning today . Had an EKG done in PST - NSR. Pt was advised to go to cardio prior to surgery for evaluation

## 2023-02-15 NOTE — H&P PST ADULT - PROBLEM SELECTOR PLAN 1
Rectal exam under anesthesia , excision of skin tag , Possible fistulotomy, Possible Botox on 2/22/2023.

## 2023-02-15 NOTE — H&P PST ADULT - HISTORY OF PRESENT ILLNESS
37 yr old male with history of     Denies Recent travel, Exposure or Covid symptoms  Covid test- 2/19/2023   37 yr old male with history of Atrial fibrillation (2016) - s/p ablation, SVT (2018) - S/P Ablation , PE/ DVT (2019) After left knee surgery, found to have a clotting disorder ( pt unsure of the name of the disorder) , had IVC filter placed since then for other knee surgeries & was subsequently removed ( last surgery - 2021) , with c/o anal skin tag for several years that makes it harder to clean . Now coming in for Rectal exam under anesthesia , excision of skin tag , Possible fistulotomy, Possible Botox on 2/22/2023.      ***Pt had c/o epigastric pressure @ times- possible indigestion as per patient , had seen GI is scheduled to go for colonoscopy & endoscopy in March 2023. Pt endorsed that he had epigastric/ Chest pressure in morning today . Had an EKG done in [facility] - NSR. Pt was advised to go to cardio prior to surgery for evaluation  . ****    Denies Recent travel, Exposure or Covid symptoms  Covid test- 2/19/2023

## 2023-02-15 NOTE — H&P PST ADULT - NSANTHOSAYNRD_GEN_A_CORE
No. TUTU screening performed.  STOP BANG Legend: 0-2 = LOW Risk; 3-4 = INTERMEDIATE Risk; 5-8 = HIGH Risk

## 2023-02-15 NOTE — H&P PST ADULT - NSICDXPASTMEDICALHX_GEN_ALL_CORE_FT
PAST MEDICAL HISTORY:  Anal skin tag     Atrial fibrillation     DVT, lower extremity     GERD (gastroesophageal reflux disease)     H/O low back pain     History of blood clotting disorder     Pulmonary embolism     SVT (supraventricular tachycardia)

## 2023-02-15 NOTE — H&P PST ADULT - NSICDXPASTSURGICALHX_GEN_ALL_CORE_FT
PAST SURGICAL HISTORY:  History of removal of retained hardware     S/P ablation of atrial fibrillation     S/P ablation of ventricular arrhythmia     S/P left knee surgery     S/P right knee surgery

## 2023-02-22 ENCOUNTER — APPOINTMENT (OUTPATIENT)
Dept: SURGERY | Facility: HOSPITAL | Age: 38
End: 2023-02-22

## 2023-08-23 NOTE — H&P PST ADULT - PROBLEM/PLAN-1
- Patient with hx of CAD with recent PCI/ALAN x 1 to mLCx 07/14/23  - Patient was transitioned off triple therapy after 1 week to only Plavix and Coumadin.   - Due to recent PCI would continue plavix uninterrupted.   - Discussed with the surgery team, states able to give plavix and hold NGT suction for 1 hour.   - If unable to hold NGT suction, would transition to aspirin 300mg rectal suppository.   - Restart statin when able to take PO.   - If surgery is required wouldn't hold plavix if at all able. DISPLAY PLAN FREE TEXT

## 2023-09-12 PROBLEM — K21.9 GASTRO-ESOPHAGEAL REFLUX DISEASE WITHOUT ESOPHAGITIS: Chronic | Status: ACTIVE | Noted: 2023-02-15

## 2023-09-12 PROBLEM — I26.99 OTHER PULMONARY EMBOLISM WITHOUT ACUTE COR PULMONALE: Chronic | Status: ACTIVE | Noted: 2023-02-15

## 2023-09-12 PROBLEM — I82.409 ACUTE EMBOLISM AND THROMBOSIS OF UNSPECIFIED DEEP VEINS OF UNSPECIFIED LOWER EXTREMITY: Chronic | Status: ACTIVE | Noted: 2023-02-15

## 2023-09-12 PROBLEM — K64.4 RESIDUAL HEMORRHOIDAL SKIN TAGS: Chronic | Status: ACTIVE | Noted: 2023-02-15

## 2023-09-12 PROBLEM — Z87.39 PERSONAL HISTORY OF OTHER DISEASES OF THE MUSCULOSKELETAL SYSTEM AND CONNECTIVE TISSUE: Chronic | Status: ACTIVE | Noted: 2023-02-15

## 2023-09-12 PROBLEM — I47.1 SUPRAVENTRICULAR TACHYCARDIA: Chronic | Status: ACTIVE | Noted: 2023-02-15

## 2023-10-06 ENCOUNTER — APPOINTMENT (OUTPATIENT)
Dept: SURGERY | Facility: CLINIC | Age: 38
End: 2023-10-06
Payer: COMMERCIAL

## 2023-10-06 VITALS
HEIGHT: 70 IN | BODY MASS INDEX: 27.92 KG/M2 | WEIGHT: 195 LBS | HEART RATE: 76 BPM | RESPIRATION RATE: 18 BRPM | TEMPERATURE: 95.8 F | DIASTOLIC BLOOD PRESSURE: 78 MMHG | OXYGEN SATURATION: 98 % | SYSTOLIC BLOOD PRESSURE: 113 MMHG

## 2023-10-06 DIAGNOSIS — F32.A DEPRESSION, UNSPECIFIED: ICD-10-CM

## 2023-10-06 PROCEDURE — 99213 OFFICE O/P EST LOW 20 MIN: CPT

## 2023-11-09 ENCOUNTER — APPOINTMENT (OUTPATIENT)
Dept: OTOLARYNGOLOGY | Facility: CLINIC | Age: 38
End: 2023-11-09
Payer: COMMERCIAL

## 2023-11-09 VITALS — WEIGHT: 195 LBS | HEIGHT: 71 IN | BODY MASS INDEX: 27.3 KG/M2

## 2023-11-09 VITALS — SYSTOLIC BLOOD PRESSURE: 121 MMHG | HEART RATE: 78 BPM | DIASTOLIC BLOOD PRESSURE: 82 MMHG

## 2023-11-09 DIAGNOSIS — Z86.79 PERSONAL HISTORY OF OTHER DISEASES OF THE CIRCULATORY SYSTEM: ICD-10-CM

## 2023-11-09 PROCEDURE — 92567 TYMPANOMETRY: CPT

## 2023-11-09 PROCEDURE — 92557 COMPREHENSIVE HEARING TEST: CPT

## 2023-11-09 PROCEDURE — 92504 EAR MICROSCOPY EXAMINATION: CPT

## 2023-11-09 PROCEDURE — 99204 OFFICE O/P NEW MOD 45 MIN: CPT

## 2023-11-09 RX ORDER — SERTRALINE 25 MG/1
TABLET, FILM COATED ORAL
Refills: 0 | Status: ACTIVE | COMMUNITY

## 2024-02-08 ENCOUNTER — APPOINTMENT (OUTPATIENT)
Dept: OTOLARYNGOLOGY | Facility: CLINIC | Age: 39
End: 2024-02-08
Payer: COMMERCIAL

## 2024-02-08 VITALS
HEIGHT: 71 IN | WEIGHT: 195 LBS | DIASTOLIC BLOOD PRESSURE: 73 MMHG | BODY MASS INDEX: 27.3 KG/M2 | SYSTOLIC BLOOD PRESSURE: 128 MMHG

## 2024-02-08 DIAGNOSIS — H81.90 UNSPECIFIED DISORDER OF VESTIBULAR FUNCTION, UNSPECIFIED EAR: ICD-10-CM

## 2024-02-08 PROCEDURE — 99213 OFFICE O/P EST LOW 20 MIN: CPT

## 2024-02-08 PROCEDURE — 92504 EAR MICROSCOPY EXAMINATION: CPT

## 2024-02-08 NOTE — HISTORY OF PRESENT ILLNESS
[de-identified] : f/u migraine - headache, dizziness was doing much better mg supplement helped a bit no specific diet triggers was almost 100% better but then last week had a cold some minor recurrence now

## 2024-03-27 ENCOUNTER — NON-APPOINTMENT (OUTPATIENT)
Age: 39
End: 2024-03-27

## 2024-04-11 ENCOUNTER — NON-APPOINTMENT (OUTPATIENT)
Age: 39
End: 2024-04-11

## 2024-05-06 DIAGNOSIS — K64.4 RESIDUAL HEMORRHOIDAL SKIN TAGS: ICD-10-CM

## 2024-05-06 DIAGNOSIS — K60.3 ANAL FISTULA: ICD-10-CM

## 2024-05-09 ENCOUNTER — APPOINTMENT (OUTPATIENT)
Dept: OTOLARYNGOLOGY | Facility: CLINIC | Age: 39
End: 2024-05-09
Payer: COMMERCIAL

## 2024-05-09 VITALS
DIASTOLIC BLOOD PRESSURE: 70 MMHG | SYSTOLIC BLOOD PRESSURE: 116 MMHG | HEIGHT: 71 IN | HEART RATE: 83 BPM | BODY MASS INDEX: 27.3 KG/M2 | WEIGHT: 195 LBS

## 2024-05-09 DIAGNOSIS — M26.623 ARTHRALGIA OF BILATERAL TEMPOROMANDIBULAR JOINT: ICD-10-CM

## 2024-05-09 DIAGNOSIS — H93.8X3 OTHER SPECIFIED DISORDERS OF EAR, BILATERAL: ICD-10-CM

## 2024-05-09 DIAGNOSIS — G43.109 MIGRAINE WITH AURA, NOT INTRACTABLE, W/OUT STATUS MIGRAINOSUS: ICD-10-CM

## 2024-05-09 PROCEDURE — 99213 OFFICE O/P EST LOW 20 MIN: CPT

## 2024-05-09 RX ORDER — BUPROPION HYDROCHLORIDE 75 MG/1
75 TABLET, FILM COATED ORAL
Refills: 0 | Status: ACTIVE | COMMUNITY

## 2024-05-09 NOTE — PHYSICAL EXAM
[Binocular Microscopic Exam] : Binocular microscopic exam was performed [Rinne Test Air Conduction Persists > Bone Conduction Right] : air conduction greater than bone conduction on the right [Rinne Test Air Conduction Persists > Bone Conduction Left] : air conduction greater than bone conduction on the left [Hearing Pedroza Test (Tuning Fork On Forehead)] : no lateralization of tone [Normal] : gait was normal [Hearing Loss Right Only] : normal [Hearing Loss Left Only] : normal [Nystagmus] : ~T no ~M nystagmus was seen [Fukuda Step Test] : Fukuda Step Test was Negative [Romberg's Sign] : Romberg's sign was absent [Fistula Sign] : Fistula Sign: Negative [Past-Pointing] : Past-Pointing: Negative [Afshin-Hallpraveenke] : [place]-Hallpike: Negative

## 2024-05-09 NOTE — HISTORY OF PRESENT ILLNESS
[de-identified] : 38 year old man presents for follow-up of migraine - headache, dizziness H/o AVS likely R VN which has largely resolved in symptoms and function. Reports he would like a script for the therapy for the pressure in his ears.  States he is physically exhausted even with sleeping 9 hours daily and not having energy is really bothering him-wife states he does snore. No known witnessed apneas. No recent migraines or severe episodes of dizziness. Denies otorrhea, recent fevers or ear infections, changed in hearing, tinnitus, headaches related to hearing.

## 2024-05-09 NOTE — PROCEDURE
[Other ___] : [unfilled] [] : Binocular Microscopy [FreeTextEntry4] : none [FreeTextEntry6] : Operative microscope was used to examine the ear canal, ear drum and visible middle ear landmarks. Adequate exam would not have been possible without the use of a microscope. Findings are described.

## 2024-05-19 ENCOUNTER — NON-APPOINTMENT (OUTPATIENT)
Age: 39
End: 2024-05-19

## 2024-06-12 ENCOUNTER — OUTPATIENT (OUTPATIENT)
Dept: OUTPATIENT SERVICES | Facility: HOSPITAL | Age: 39
LOS: 1 days | End: 2024-06-12
Payer: COMMERCIAL

## 2024-06-12 ENCOUNTER — TRANSCRIPTION ENCOUNTER (OUTPATIENT)
Age: 39
End: 2024-06-12

## 2024-06-12 VITALS
TEMPERATURE: 99 F | HEART RATE: 87 BPM | RESPIRATION RATE: 16 BRPM | HEIGHT: 71 IN | WEIGHT: 199.96 LBS | OXYGEN SATURATION: 97 % | SYSTOLIC BLOOD PRESSURE: 115 MMHG | DIASTOLIC BLOOD PRESSURE: 73 MMHG

## 2024-06-12 DIAGNOSIS — Z98.890 OTHER SPECIFIED POSTPROCEDURAL STATES: Chronic | ICD-10-CM

## 2024-06-12 DIAGNOSIS — K64.4 RESIDUAL HEMORRHOIDAL SKIN TAGS: ICD-10-CM

## 2024-06-12 DIAGNOSIS — Z95.828 PRESENCE OF OTHER VASCULAR IMPLANTS AND GRAFTS: Chronic | ICD-10-CM

## 2024-06-12 DIAGNOSIS — Z01.818 ENCOUNTER FOR OTHER PREPROCEDURAL EXAMINATION: ICD-10-CM

## 2024-06-12 LAB
ANION GAP SERPL CALC-SCNC: 13 MMOL/L — SIGNIFICANT CHANGE UP (ref 5–17)
APTT BLD: 33.4 SEC — SIGNIFICANT CHANGE UP (ref 24.5–35.6)
BUN SERPL-MCNC: 14 MG/DL — SIGNIFICANT CHANGE UP (ref 7–23)
CALCIUM SERPL-MCNC: 9.6 MG/DL — SIGNIFICANT CHANGE UP (ref 8.4–10.5)
CHLORIDE SERPL-SCNC: 102 MMOL/L — SIGNIFICANT CHANGE UP (ref 96–108)
CO2 SERPL-SCNC: 27 MMOL/L — SIGNIFICANT CHANGE UP (ref 22–31)
CREAT SERPL-MCNC: 1.02 MG/DL — SIGNIFICANT CHANGE UP (ref 0.5–1.3)
EGFR: 97 ML/MIN/1.73M2 — SIGNIFICANT CHANGE UP
GLUCOSE SERPL-MCNC: 73 MG/DL — SIGNIFICANT CHANGE UP (ref 70–99)
HCT VFR BLD CALC: 45.2 % — SIGNIFICANT CHANGE UP (ref 39–50)
HGB BLD-MCNC: 14.9 G/DL — SIGNIFICANT CHANGE UP (ref 13–17)
INR BLD: 1.05 RATIO — SIGNIFICANT CHANGE UP (ref 0.85–1.18)
MCHC RBC-ENTMCNC: 29.9 PG — SIGNIFICANT CHANGE UP (ref 27–34)
MCHC RBC-ENTMCNC: 33 GM/DL — SIGNIFICANT CHANGE UP (ref 32–36)
MCV RBC AUTO: 90.6 FL — SIGNIFICANT CHANGE UP (ref 80–100)
NRBC # BLD: 0 /100 WBCS — SIGNIFICANT CHANGE UP (ref 0–0)
PLATELET # BLD AUTO: 313 K/UL — SIGNIFICANT CHANGE UP (ref 150–400)
POTASSIUM SERPL-MCNC: 4 MMOL/L — SIGNIFICANT CHANGE UP (ref 3.5–5.3)
POTASSIUM SERPL-SCNC: 4 MMOL/L — SIGNIFICANT CHANGE UP (ref 3.5–5.3)
PROTHROM AB SERPL-ACNC: 11 SEC — SIGNIFICANT CHANGE UP (ref 9.5–13)
RBC # BLD: 4.99 M/UL — SIGNIFICANT CHANGE UP (ref 4.2–5.8)
RBC # FLD: 13 % — SIGNIFICANT CHANGE UP (ref 10.3–14.5)
SODIUM SERPL-SCNC: 142 MMOL/L — SIGNIFICANT CHANGE UP (ref 135–145)
WBC # BLD: 6.73 K/UL — SIGNIFICANT CHANGE UP (ref 3.8–10.5)
WBC # FLD AUTO: 6.73 K/UL — SIGNIFICANT CHANGE UP (ref 3.8–10.5)

## 2024-06-12 PROCEDURE — 85610 PROTHROMBIN TIME: CPT

## 2024-06-12 PROCEDURE — 85730 THROMBOPLASTIN TIME PARTIAL: CPT

## 2024-06-12 PROCEDURE — 80048 BASIC METABOLIC PNL TOTAL CA: CPT

## 2024-06-12 PROCEDURE — G0463: CPT

## 2024-06-12 PROCEDURE — 36415 COLL VENOUS BLD VENIPUNCTURE: CPT

## 2024-06-12 PROCEDURE — 85027 COMPLETE CBC AUTOMATED: CPT

## 2024-06-12 RX ORDER — IBUPROFEN 200 MG
1 TABLET ORAL
Qty: 0 | Refills: 0 | DISCHARGE

## 2024-06-12 RX ORDER — DEXTROAMPHETAMINE SACCHARATE, AMPHETAMINE ASPARTATE, DEXTROAMPHETAMINE SULFATE AND AMPHETAMINE SULFATE 1.875; 1.875; 1.875; 1.875 MG/1; MG/1; MG/1; MG/1
1 TABLET ORAL
Qty: 0 | Refills: 0 | DISCHARGE

## 2024-06-12 NOTE — H&P PST ADULT - NSICDXPASTMEDICALHX_GEN_ALL_CORE_FT
PAST MEDICAL HISTORY:  Anal skin tag     Atrial fibrillation     DVT, lower extremity     GERD (gastroesophageal reflux disease)     H/O low back pain     History of blood clotting disorder     Lupus anticoagulant positive     Pulmonary embolism     Residual hemorrhoidal skin tags     SVT (supraventricular tachycardia)      PAST MEDICAL HISTORY:  ADHD     Anal skin tag     Atrial fibrillation     DVT, lower extremity     GERD (gastroesophageal reflux disease)     H/O low back pain     Lupus anticoagulant positive     Pulmonary embolism     Residual hemorrhoidal skin tags     SVT (supraventricular tachycardia)

## 2024-06-12 NOTE — H&P PST ADULT - HISTORY OF PRESENT ILLNESS
AF for over ten years with last ablation 8 years ago 39 year old male with PMH ADHD, anxiety/depression, AF (Dx 10 years ago) s/p multiple ablations (with last 8 years ago - no longer follows with cardiologist - no AC), PE (2018 - s/p left knee arthroscopy surgery at [facility] - treated with Xarelto), positive lupus anticoagulant and GERD (OTCs PRN) reports c/o anal skin tag for over ten years with discomfort/swelling and blood in toilet paper when wiping intermittently. Pt now presents to PST for scheduled rectal examination under anesthesia, excision of tag, anal fistulotomy and possible botox with Dr. Borges on 6/13/24 at the ambulatory care center. 39 year old male with PMH ADHD, anxiety/depression, AF (Dx 10 years ago) s/p multiple ablations (with last 8 years ago - no longer follows with cardiologist - no AC), PE (2018 - s/p left knee arthroscopy surgery at [facility] - treated with Xarelto), positive lupus anticoagulant and GERD (OTCs PRN) reports c/o anal skin tag for over ten years with discomfort/swelling and blood in toilet paper when wiping intermittently. Pt now presents to [facility] for scheduled rectal examination under anesthesia, excision of tag, anal fistulotomy and possible botox with Dr. Borges on 6/13/24 at the ambulatory care center.    **Discussed case with anesthesia team. Contacted patient's hematologist Dr. Brantley today for any recommendations given pts above history. Dr. Brantley does not recommend anticoagulation before, during or after procedure. Will proceed with case tomorrow as per anesthesia team.**

## 2024-06-12 NOTE — H&P PST ADULT - ASSESSMENT
DASI score: 9.89  DASI activity: Able to walk 2-3 blocks, ADLs, Grocery Shopping, 1 Flight of Stairs, plays soccer weekly  Loose teeth or denture: denies

## 2024-06-12 NOTE — H&P PST ADULT - OTHER CARE PROVIDERS
[facility] EP team [phone] - last visit 2 years ago for F/U; [facility] with Dr. Axel Brantley [phone] - last visit in 2021 for hematology eval prior to surgery (in chart)

## 2024-06-12 NOTE — H&P PST ADULT - NSICDXPASTSURGICALHX_GEN_ALL_CORE_FT
PAST SURGICAL HISTORY:  History of colonoscopy     History of removal of retained hardware     S/P ablation of atrial fibrillation     S/P ablation of ventricular arrhythmia     S/P insertion of IVC (inferior vena caval) filter     S/P left knee surgery     S/P right knee surgery      PAST SURGICAL HISTORY:  History of colonoscopy     History of removal of retained hardware     S/P ablation of atrial fibrillation     S/P ablation of ventricular arrhythmia     S/P left knee surgery     S/P right knee surgery

## 2024-06-12 NOTE — H&P PST ADULT - LAST STRESS TEST
possibly 3 years ago for pre-operative evaluation prior to prior surgery - as per patient, results were normal possibly 3 years ago for pre-operative evaluation prior to knee arthroscopy surgery - as per patient, results were normal

## 2024-06-12 NOTE — H&P PST ADULT - PROBLEM SELECTOR PLAN 1
Pt is scheduled for a rectal examination under anesthesia, excision of tag, anal fistulotomy and possible botox with Dr. Borges on 6/13/24 at the ambulatory care center  CBC, BMP and coags ordered and obtained at [facility]  discussed case with anesthesia team via TEAMS

## 2024-06-13 ENCOUNTER — OUTPATIENT (OUTPATIENT)
Dept: OUTPATIENT SERVICES | Facility: HOSPITAL | Age: 39
LOS: 1 days | End: 2024-06-13
Payer: COMMERCIAL

## 2024-06-13 ENCOUNTER — TRANSCRIPTION ENCOUNTER (OUTPATIENT)
Age: 39
End: 2024-06-13

## 2024-06-13 ENCOUNTER — RESULT REVIEW (OUTPATIENT)
Age: 39
End: 2024-06-13

## 2024-06-13 ENCOUNTER — APPOINTMENT (OUTPATIENT)
Dept: SURGERY | Facility: HOSPITAL | Age: 39
End: 2024-06-13
Payer: COMMERCIAL

## 2024-06-13 VITALS
HEIGHT: 71 IN | HEART RATE: 88 BPM | OXYGEN SATURATION: 97 % | TEMPERATURE: 97 F | DIASTOLIC BLOOD PRESSURE: 70 MMHG | SYSTOLIC BLOOD PRESSURE: 115 MMHG | WEIGHT: 199.96 LBS | RESPIRATION RATE: 16 BRPM

## 2024-06-13 VITALS
RESPIRATION RATE: 16 BRPM | DIASTOLIC BLOOD PRESSURE: 62 MMHG | SYSTOLIC BLOOD PRESSURE: 100 MMHG | OXYGEN SATURATION: 98 % | HEART RATE: 68 BPM

## 2024-06-13 DIAGNOSIS — K64.4 RESIDUAL HEMORRHOIDAL SKIN TAGS: ICD-10-CM

## 2024-06-13 DIAGNOSIS — Z98.890 OTHER SPECIFIED POSTPROCEDURAL STATES: Chronic | ICD-10-CM

## 2024-06-13 PROCEDURE — 46505 CHEMODENERVATION ANAL MUSC: CPT

## 2024-06-13 PROCEDURE — 46270 REMOVE ANAL FIST SUBQ: CPT

## 2024-06-13 PROCEDURE — 88304 TISSUE EXAM BY PATHOLOGIST: CPT | Mod: 26

## 2024-06-13 PROCEDURE — 88304 TISSUE EXAM BY PATHOLOGIST: CPT

## 2024-06-13 RX ORDER — LIDOCAINE HCL 20 MG/ML
0.2 VIAL (ML) INJECTION ONCE
Refills: 0 | Status: ACTIVE | OUTPATIENT
Start: 2024-06-13

## 2024-06-13 RX ORDER — ONDANSETRON 8 MG/1
4 TABLET, FILM COATED ORAL ONCE
Refills: 0 | Status: ACTIVE | OUTPATIENT
Start: 2024-06-13 | End: 2025-05-12

## 2024-06-13 RX ORDER — SODIUM CHLORIDE 9 MG/ML
1000 INJECTION, SOLUTION INTRAVENOUS
Refills: 0 | Status: ACTIVE | OUTPATIENT
Start: 2024-06-13 | End: 2025-05-12

## 2024-06-13 RX ORDER — OXYCODONE HYDROCHLORIDE 5 MG/1
5 TABLET ORAL ONCE
Refills: 0 | Status: DISCONTINUED | OUTPATIENT
Start: 2024-06-13 | End: 2024-06-13

## 2024-06-13 RX ORDER — DEXTROAMPHETAMINE SACCHARATE, AMPHETAMINE ASPARTATE, DEXTROAMPHETAMINE SULFATE AND AMPHETAMINE SULFATE 1.875; 1.875; 1.875; 1.875 MG/1; MG/1; MG/1; MG/1
1 TABLET ORAL
Refills: 0 | DISCHARGE

## 2024-06-13 RX ORDER — OXYCODONE HYDROCHLORIDE 5 MG/1
1 TABLET ORAL
Qty: 8 | Refills: 0
Start: 2024-06-13

## 2024-06-13 RX ORDER — BUPROPION HYDROCHLORIDE 150 MG/1
1 TABLET, EXTENDED RELEASE ORAL
Refills: 0 | DISCHARGE

## 2024-06-13 RX ORDER — METRONIDAZOLE 7.5 MG/G
1 GEL VAGINAL
Qty: 1 | Refills: 0
Start: 2024-06-13

## 2024-06-13 NOTE — ASU DISCHARGE PLAN (ADULT/PEDIATRIC) - CARE PROVIDER_API CALL
Levon Borges  Colon/Rectal Surgery  [address] [facility] [address]  [place] [address]  Phone: [phone]  Fax: [phone]  Follow Up Time: 2 weeks

## 2024-06-13 NOTE — ASU PATIENT PROFILE, ADULT - FALL HARM RISK - UNIVERSAL INTERVENTIONS
Bed in lowest position, wheels locked, appropriate side rails in place/Call bell, personal items and telephone in reach/Instruct patient to call for assistance before getting out of bed or chair/Non-slip footwear when patient is out of bed/[place] to call system/Physically safe environment - no spills, clutter or unnecessary equipment/Purposeful Proactive Rounding/Room/bathroom lighting operational, light cord in reach

## 2024-06-13 NOTE — BRIEF OPERATIVE NOTE - OPERATION/FINDINGS
Perianal disease with skin tag, skin bridging, fistulizing disease. Skin tag excised, fistulotomy, and botox injection.

## 2024-06-13 NOTE — ASU PATIENT PROFILE, ADULT - NSICDXPASTMEDICALHX_GEN_ALL_CORE_FT
PAST MEDICAL HISTORY:  ADHD     Anal skin tag     Atrial fibrillation     DVT, lower extremity     GERD (gastroesophageal reflux disease)     H/O low back pain     Lupus anticoagulant positive     Pulmonary embolism     Residual hemorrhoidal skin tags     SVT (supraventricular tachycardia)

## 2024-06-13 NOTE — ASU PREOP CHECKLIST - HEART RATE (BEATS/MIN)
Spoke with pts daughter per verbal release about overdue dexa scan.  She was given the number to call and schedule Dexa,  I will postpone order for 1 month   88

## 2024-06-13 NOTE — PRE-ANESTHESIA EVALUATION ADULT - LAST STRESS TEST
possibly 3 years ago for pre-operative evaluation prior to knee arthroscopy surgery - as per patient, results were normal

## 2024-06-13 NOTE — ASU DISCHARGE PLAN (ADULT/PEDIATRIC) - NS MD DC FALL RISK RISK
For information on Fall & Injury Prevention, visit: [email or web address] OR  [email or web address] OR  https://www.cdc.gov/steadi/patient.html

## 2024-06-13 NOTE — ASU DISCHARGE PLAN (ADULT/PEDIATRIC) - NURSING INSTRUCTIONS
OK to take Tylenol/Acetaminophen 1000 mg  at 6:30 PM for pain and every 6 hours after as needed. OK to take Motrin/Ibuprofen 600 mg 2 hours after taking Tylenol  and every 6 hours after as needed.

## 2024-06-13 NOTE — BRIEF OPERATIVE NOTE - NSICDXBRIEFPROCEDURE_GEN_ALL_CORE_FT
PROCEDURES:  Anal tag excision 13-Jun-2024 13:10:34  Jesus Casas  Injection of Botox into anus 13-Jun-2024 13:10:45  Jesus Casas  Rectal examination under anesthesia with fistulectomy 13-Jun-2024 13:11:35  Jesus Casas

## 2024-06-14 ENCOUNTER — NON-APPOINTMENT (OUTPATIENT)
Age: 39
End: 2024-06-14

## 2024-06-18 LAB — SURGICAL PATHOLOGY STUDY: SIGNIFICANT CHANGE UP

## 2024-07-30 NOTE — ED PROVIDER NOTE - DATE/TIME 1
----- Message from Polina Nazario RN sent at 7/30/2024  8:51 AM CDT -----  Regarding: Clinic follow up  Good morning  Dr CADENA saw this pt in clinic today    Please schedule him for the following    1) URGENT appt with colon rectal surgery - reason- he has anal fissure and hemmorhoid that needs to be looked at here in [facility]- no one at home will treat him due to advanced heart failure- needs to be seen in next 1-2 weeks please.    2)  THURSDAY, AUGUST 29 AFTERNOON- labs: bnp cmp hla pra  Visit with any HTS provider    Thnereidak you  
Appt had to be made with Dr Wright to be able to use the 8/29 date and scheduled labs also that day, referral department will call him to schedule or send message through portal  
31-Jan-2018 00:41

## 2024-08-07 ENCOUNTER — APPOINTMENT (OUTPATIENT)
Dept: OTOLARYNGOLOGY | Facility: CLINIC | Age: 39
End: 2024-08-07

## 2024-09-21 ENCOUNTER — NON-APPOINTMENT (OUTPATIENT)
Age: 39
End: 2024-09-21

## 2024-10-08 ENCOUNTER — APPOINTMENT (OUTPATIENT)
Dept: OTOLARYNGOLOGY | Facility: CLINIC | Age: 39
End: 2024-10-08
Payer: COMMERCIAL

## 2024-10-08 VITALS
HEART RATE: 73 BPM | HEIGHT: 71 IN | DIASTOLIC BLOOD PRESSURE: 65 MMHG | BODY MASS INDEX: 27.3 KG/M2 | SYSTOLIC BLOOD PRESSURE: 100 MMHG | WEIGHT: 195 LBS

## 2024-10-08 DIAGNOSIS — G43.109 MIGRAINE WITH AURA, NOT INTRACTABLE, W/OUT STATUS MIGRAINOSUS: ICD-10-CM

## 2024-10-08 DIAGNOSIS — H93.8X3 OTHER SPECIFIED DISORDERS OF EAR, BILATERAL: ICD-10-CM

## 2024-10-08 DIAGNOSIS — R09.89 OTHER SPECIFIED SYMPTOMS AND SIGNS INVOLVING THE CIRCULATORY AND RESPIRATORY SYSTEMS: ICD-10-CM

## 2024-10-08 PROCEDURE — 99214 OFFICE O/P EST MOD 30 MIN: CPT

## 2024-10-08 PROCEDURE — 92504 EAR MICROSCOPY EXAMINATION: CPT

## 2024-10-08 RX ORDER — AZELASTINE HYDROCHLORIDE 137 UG/1
0.1 SPRAY, METERED NASAL
Qty: 1 | Refills: 3 | Status: ACTIVE | COMMUNITY
Start: 2024-10-08 | End: 1900-01-01

## 2024-10-08 RX ORDER — MONTELUKAST 10 MG/1
10 TABLET, FILM COATED ORAL
Qty: 30 | Refills: 2 | Status: ACTIVE | COMMUNITY
Start: 2024-10-08 | End: 1900-01-01

## (undated) DEVICE — DRAPE TOWEL BLUE STICKY

## (undated) DEVICE — DRAPE INSTRUMENT POUCH 6.75" X 11"

## (undated) DEVICE — PREP BETADINE KIT

## (undated) DEVICE — LUBRICATING JELLY ONESHOT 1.25OZ

## (undated) DEVICE — TAPE SILK 3"

## (undated) DEVICE — CATH IV SAFE INSYTE 18G X 1.16" (GREEN)

## (undated) DEVICE — POSITIONER FOAM EGG CRATE ULNAR 2PCS (PINK)

## (undated) DEVICE — DRAPE THYROID 77" X 123"

## (undated) DEVICE — WARMING BLANKET UPPER ADULT

## (undated) DEVICE — SPECIMEN CONTAINER 100ML

## (undated) DEVICE — VENODYNE/SCD SLEEVE CALF MEDIUM

## (undated) DEVICE — SUT POLYSORB 2-0 30" V-20 UNDYED

## (undated) DEVICE — DRSG COMBINE 5X9"

## (undated) DEVICE — GLV 7.5 PROTEXIS (WHITE)

## (undated) DEVICE — SOL IRR POUR NS 0.9% 500ML

## (undated) DEVICE — SUT CHROMIC 2-0 30" GS-22

## (undated) DEVICE — DRSG MASTISOL

## (undated) DEVICE — SOL IRR POUR H2O 250ML

## (undated) DEVICE — VESSEL LOOP MAXI-RED  0.120" X 16"

## (undated) DEVICE — SUT SOFSILK 0 30" V-20

## (undated) DEVICE — PACK MINOR

## (undated) DEVICE — NDL HYPO REGULAR BEVEL 25G X 1.5" (BLUE)

## (undated) DEVICE — MEDICATION LABELS W MARKER